# Patient Record
Sex: MALE | Race: WHITE | Employment: FULL TIME | URBAN - METROPOLITAN AREA
[De-identification: names, ages, dates, MRNs, and addresses within clinical notes are randomized per-mention and may not be internally consistent; named-entity substitution may affect disease eponyms.]

---

## 2017-02-15 ENCOUNTER — OFFICE VISIT (OUTPATIENT)
Dept: FAMILY MEDICINE | Facility: CLINIC | Age: 32
End: 2017-02-15
Payer: COMMERCIAL

## 2017-02-15 DIAGNOSIS — L21.9 SEBORRHEIC DERMATITIS: Primary | ICD-10-CM

## 2017-02-15 DIAGNOSIS — L30.0 NUMMULAR ECZEMA: ICD-10-CM

## 2017-02-15 DIAGNOSIS — L70.0 ACNE VULGARIS: ICD-10-CM

## 2017-02-15 PROCEDURE — 99214 OFFICE O/P EST MOD 30 MIN: CPT | Performed by: FAMILY MEDICINE

## 2017-02-15 RX ORDER — PIMECROLIMUS 10 MG/G
CREAM TOPICAL 2 TIMES DAILY
Qty: 60 G | Refills: 0 | Status: CANCELLED | OUTPATIENT
Start: 2017-02-15

## 2017-02-15 RX ORDER — CICLOPIROX OLAMINE 7.7 MG/G
CREAM TOPICAL 2 TIMES DAILY
Status: CANCELLED | OUTPATIENT
Start: 2017-02-15

## 2017-02-15 RX ORDER — FLUOCINONIDE TOPICAL SOLUTION USP, 0.05% 0.5 MG/ML
SOLUTION TOPICAL DAILY
Qty: 60 ML | Refills: 0 | Status: SHIPPED | OUTPATIENT
Start: 2017-02-15 | End: 2019-10-14

## 2017-02-15 RX ORDER — TACROLIMUS 0.3 MG/G
OINTMENT TOPICAL
Qty: 30 G | Refills: 0 | Status: SHIPPED | OUTPATIENT
Start: 2017-02-15 | End: 2020-06-25

## 2017-02-15 NOTE — PATIENT INSTRUCTIONS
"FUTURE APPOINTMENTS  Follow up as needed.    SHAMPOO INSTRUCTIONS  Alternating use of shampoos with different active ingredients every 4 week(s).   Coal Tar shampoos : Neutrogena T/Gel, Denorex, DHS Tar, Ionil-T, Tegrin, X-Seb T, Zetar  Zinc Pyrithione shampoos : Head & Shoulders, Denorex Advance Formula, DHS Zinc, Zincon  Salicylic Acid shampoos : Neutrogena T/Sal, DHS Sal, Ionil, P&S, Sebulex, X-Seb  Selenium Sulfide shampoos : Selsun Blue shampoo  Sulfur shampoos : Sebulex    TOPICAL STEROID INSTRUCTIONS - for use on scalp  Fluocinonide 0.05% solution.    Apply a few drops and rub into the affected areas on the scalp, at bedtime for 7-10 days.    Not to be used on face or groin.      After initial treatment, topical steroid may be used as needed for flare-ups. However, do not use for more than 10 consecutive days.     Topical steroid use is for short-term treatment only. If after initial treatment, you are using this for prolonged periods of time, return to clinic for re-evaluation of treatment.    TOPICAL MEDICATION INSTRUCTIONS - for use on face  Tacrolimus (Protopic) 0.1% ointment.    Apply a thin layer to the affected area(s) two times per day. Discontinue when symptoms have resolved.    This is not a steroid, and it can be used on the face.    Keep in mind to also regularly use moisturizer, as this preventative measure can help maintain your skin's natural moisture barrier.    Apply moisturizer after application of medication.    Your health insurance may need a prior authorization for coverage of this medication.    Although there is a black box warning for cancer risk from this medication, the study involved oral consumption by monkeys of this medication at 30 times the maximum recommended dosage.  Consider comparing prices on [employer] or [employer]ire     ACNE TREATMENT PLAN  Morning or Evening (whenever you shower) :    Cetaphil facial cleanser.    Do a \"20/10\" wash (20 seconds of skin contact " "with the cleanser followed by a 10 second rinse)    Apply to face.    After cleansing, medication : take by mouth 1 tablet of oral Minocycline 50 mg until supply has finished.    Evening or Morning (other time of day) :    Cetaphil facial cleanser - Do a \"20 / 10 wash\" again.    Recommendations if skin becomes too dry in response to medication:    Use Cetaphil facial moisturizer.    Call back directly if there are further questions, or if these symptoms fail to improve as anticipated or worsen.   "

## 2017-02-15 NOTE — MR AVS SNAPSHOT
After Visit Summary   2/15/2017    Jet Yañez    MRN: [id number]           Patient Information     Date Of Birth          1985        Visit Information        Provider Department      2/15/2017 2:20 PM Lisa Lao MD [facility] - Primary Care Skin        Today's Diagnoses     Seborrheic dermatitis    -  1    Nummular eczema          Care Instructions    FUTURE APPOINTMENTS  Follow up as needed.    SHAMPOO INSTRUCTIONS  Alternating use of shampoos with different active ingredients every 4 week(s).   Coal Tar shampoos : Neutrogena T/Gel, Denorex, DHS Tar, Ionil-T, Tegrin, X-Seb T, Zetar  Zinc Pyrithione shampoos : Head & Shoulders, Denorex Advance Formula, DHS Zinc, Zincon  Salicylic Acid shampoos : Neutrogena T/Sal, DHS Sal, Ionil, P&S, Sebulex, X-Seb  Selenium Sulfide shampoos : Selsun Blue shampoo  Sulfur shampoos : Sebulex    TOPICAL STEROID INSTRUCTIONS - for use on scalp  Fluocinonide 0.05% solution.    Apply a few drops and rub into the affected areas on the scalp, at bedtime for 7-10 days.    Not to be used on face or groin.      After initial treatment, topical steroid may be used as needed for flare-ups. However, do not use for more than 10 consecutive days.     Topical steroid use is for short-term treatment only. If after initial treatment, you are using this for prolonged periods of time, return to clinic for re-evaluation of treatment.    TOPICAL MEDICATION INSTRUCTIONS - for use on face  Tacrolimus (Protopic) 0.1% ointment.    Apply a thin layer to the affected area(s) two times per day. Discontinue when symptoms have resolved.    This is not a steroid, and it can be used on the face.    Keep in mind to also regularly use moisturizer, as this preventative measure can help maintain your skin's natural moisture barrier.    Apply moisturizer after application of medication.    Your health insurance may need a prior authorization for coverage of this  "medication.    Although there is a black box warning for cancer risk from this medication, the study involved oral consumption by monkeys of this medication at 30 times the maximum recommended dosage.  Consider comparing prices on [employer] or [employer]     ACNE TREATMENT PLAN  Morning or Evening (whenever you shower) :    Cetaphil facial cleanser.    Do a \"20/10\" wash (20 seconds of skin contact with the cleanser followed by a 10 second rinse)    Apply to face.    After cleansing, medication : take by mouth 1 tablet of oral Minocycline 50 mg until supply has finished.    Evening or Morning (other time of day) :    Cetaphil facial cleanser - Do a \"20 / 10 wash\" again.    Recommendations if skin becomes too dry in response to medication:    Use Cetaphil facial moisturizer.    Call back directly if there are further questions, or if these symptoms fail to improve as anticipated or worsen.         Follow-ups after your visit        Follow-up notes from your care team     Return if symptoms worsen or fail to improve.      Who to contact     If you have questions or need follow up information about today's clinic visit or your schedule please contact [facility] - PRIMARY C[facility] SK[facility] directly at [phone].  Normal or non-critical lab and imaging results will be communicated to you by GotGamehart, letter or phone within 4 business days after the clinic has received the results. If you do not hear from us within 7 days, please contact the clinic through GotGamehart or phone. If you have a critical or abnormal lab result, we will notify you by phone as soon as possible.  Submit refill requests through [employer]ius or call your pharmacy and they will forward the refill request to us. Please allow 3 business days for your refill to be completed.          Additional Information About Your Visit        GotGamehart Information     [employer]ius lets you send messages to your doctor, view your test results, renew your prescriptions, " "schedule appointments and more. To sign up, go to [email or web address] . Click on \"Log in\" on the left side of the screen, which will take you to the Welcome page. Then click on \"Sign up Now\" on the right side of the page.     You will be asked to enter the access code listed below, as well as some personal information. Please follow the directions to create your username and password.     Your access code is: [email or web address]  Expires: 2017  2:40 PM     Your access code will  in 90 days. If you need help or a new code, please call your [facility] clinic or [phone].        Care EveryWhere ID     This is your Care EveryWhere ID. This could be used by other organizations to access your [facility] medical records  [id number]         Blood Pressure from Last 3 Encounters:   07 136/68    Weight from Last 3 Encounters:   07 207 lb (93.9 kg)              Today, you had the following     No orders found for display         Today's Medication Changes          These changes are accurate as of: 2/15/17  2:44 PM.  If you have any questions, ask your nurse or doctor.               Start taking these medicines.        Dose/Directions    fluocinonide 0.05 % solution   Commonly known as:  LIDEX   Used for:  Seborrheic dermatitis   Started by:  Lisa Lao MD        Apply topically daily   Quantity:  60 mL   Refills:  0       tacrolimus 0.03 % ointment   Commonly known as:  PROTOPIC   Used for:  Seborrheic dermatitis   Started by:  Lisa Lao MD        Apply to affected areas on face bid when needed.   Quantity:  30 g   Refills:  0            Where to get your medicines      These medications were sent to [facility] - [place], [place] - [address]  [address], [place] [address]     Phone:  [phone]     fluocinonide 0.05 % solution    tacrolimus 0.03 % ointment                Primary Care Provider Office Phone # Fax #    Arnulfo Mendoza [phone] " [phone]       [facility] [address] [facility] [address]        Thank you!     Thank you for choosing [facility] - PRIMARY CARE SKIN  for your care. Our goal is always to provide you with excellent care. Hearing back from our patients is one way we can continue to improve our services. Please take a few minutes to complete the written survey that you may receive in the mail after your visit with us. Thank you!             Your Updated Medication List - Protect others around you: Learn how to safely use, store and throw away your medicines at www.disposemymeds.org.          This list is accurate as of: 2/15/17  2:44 PM.  Always use your most recent med list.                   Brand Name Dispense Instructions for use    * CICLOPIROX & LACQUER REMOVAL EX          * ciclopirox 8 % Soln     6.6 mL    Apply to affected nails q hs       desonide 0.05 % cream    DESOWEN    15 g    Apply sparingly to affected area on side of the nose  q day for 7 days and then when needed. Not to be used on a daily basis.       fluocinonide 0.05 % solution    LIDEX    60 mL    Apply topically daily       ketoconazole 2 % cream    NIZORAL    30 g    Apply topically 2 times daily       metroNIDAZOLE 0.75 % cream    METROCREAM     Apply topically 2 times daily       * minocycline 50 MG capsule    MINOCIN/DYNACIN         * minocycline 50 MG capsule    MINOCIN/DYNACIN    60 capsule    2 caps po q day       tacrolimus 0.03 % ointment    PROTOPIC    30 g    Apply to affected areas on face bid when needed.       * Notice:  This list has 4 medication(s) that are the same as other medications prescribed for you. Read the directions carefully, and ask your doctor or other care provider to review them with you.

## 2017-02-15 NOTE — PROGRESS NOTES
[facility] - PRIMARY CARE SKIN    CC : seborrheic dermatitis, acne  SUBJECTIVE:                                                    Jet Yañez is a 31 year old male who presents to clinic today for follow-up of seborrheic dermatitis on the sides of the nose and on the scalp. Dandruff has not been as much of an issue so he stopped using the anti dandruff shampoos but he has noticed some red scaly patches on the side of the scalp.  Scalp is most itchy after warm showers in the morning.    Current treatment :   Ketoconazole 2% cream has been ineffective with 2 weeks' usage.  Metronidazole 0.75% gel irritated symptoms, making symptoms redder.  Desonide 0.05% cream controls symptoms, but he has had to use it more frequently.    Side effects noted : none    Acne  Acne control is satisfactory.    Current treatment : minocycline 100 mg qd.  Response to treatment : Minocycline has given good control of pimples on the face.  Side effects noted : none    Rash  A red, circular rash has been on the groin which was mildly itchy at onset. It has turned dark brown after beginning to resolve. He denies use of new products or clothing.        Uses sunscreen : YES.  Previous history of significant sun exposure : YES.    Personal Medical History  Skin Cancer : NO  Eczema Psoriasis Rosacea Sensitive Skin Autoimmune   ? YES - since childhood NO NO ? YES NO     Family Medical History  Skin Cancer : NO  Eczema Psoriasis Rosacea Sensitive Skin Autoimmune   YES NO NO YES YES - Graves' disease     Occupation : engineering (indoor).    There is no problem list on file for this patient.      History reviewed. No pertinent past medical history. Past Surgical History   Procedure Laterality Date     No history of surgery        Social History   Substance Use Topics     Smoking status: Never Smoker     Smokeless tobacco: Not on file     Alcohol use Yes      Comment: 3 to 5 drinkd weekly         Prescription Medications as of 2/15/2017              fluocinonide (LIDEX) 0.05 % solution Apply topically daily    tacrolimus (PROTOPIC) 0.03 % ointment Apply to affected areas on face bid when needed.    desonide (DESOWEN) 0.05 % cream Apply sparingly to affected area on side of the nose  q day for 7 days and then when needed. Not to be used on a daily basis.    minocycline (MINOCIN,DYNACIN) 50 MG capsule     CICLOPIROX & LACQUER REMOVAL EX     metroNIDAZOLE (METROCREAM) 0.75 % cream Apply topically 2 times daily    ketoconazole (NIZORAL) 2 % cream Apply topically 2 times daily    minocycline (MINOCIN,DYNACIN) 50 MG capsule 2 caps po q day    ciclopirox 8 % SOLN Apply to affected nails q hs          No Known Allergies     INTEGUMENTARY/SKIN: NEGATIVE for acne. POSITIVE for changing lesion, nail changes, pigmentation, pruritis, rash and scaling  ROS : 14 point review of systems was negative except the symptoms listed above in the HPI.    This document serves as a record of the services and decisions personally performed and made by Shanae Lao MD. It was created on her behalf by Alen Collado, a trained medical scribe.  The creation of this document is based on the scribe's personal observations and the provider's statements to the medical scribe.  Alen Collado, February 15, 2017 2:28 PM      OBJECTIVE:                                                    GENERAL: healthy, alert and no distress  SKIN: [place] Skin Type - II.  Face were examined. The dermatoscope was used to help evaluate pigmented lesions.  Skin Pertinent Findings:  Face, along nasolabial grooves : Residual erythema. No acneiform lesions.    Scalp : Scattered erythematous patches with greasy scales on left and right parietal scalps, slight extension of occipital scalp, sparing crown of scalp.    Fingernails : No pitting.    Left proximal medial thigh : Patch of residual post-inflammatory hyperpigmentation.    Diagnostic Test Results:  none       ASSESSMENT:                                                       Encounter Diagnoses   Name Primary?     Seborrheic dermatitis Yes     Nummular eczema      Acne vulgaris          PLAN:                                                    Patient Instructions   FUTURE APPOINTMENTS  Follow up as needed.    SHAMPOO INSTRUCTIONS  Alternating use of shampoos with different active ingredients every 4 week(s).   Coal Tar shampoos : Neutrogena T/Gel, Denorex, DHS Tar, Ionil-T, Tegrin, X-Seb T, Zetar  Zinc Pyrithione shampoos : Head & Shoulders, Denorex Advance Formula, DHS Zinc, Zincon  Salicylic Acid shampoos : Neutrogena T/Sal, DHS Sal, Ionil, P&S, Sebulex, X-Seb  Selenium Sulfide shampoos : Selsun Blue shampoo  Sulfur shampoos : Sebulex    TOPICAL STEROID INSTRUCTIONS - for use on scalp  Fluocinonide 0.05% solution.    Apply a few drops and rub into the affected areas on the scalp, at bedtime for 7-10 days.    Not to be used on face or groin.      After initial treatment, topical steroid may be used as needed for flare-ups. However, do not use for more than 10 consecutive days.     Topical steroid use is for short-term treatment only. If after initial treatment, you are using this for prolonged periods of time, return to clinic for re-evaluation of treatment.    TOPICAL MEDICATION INSTRUCTIONS - for use on face  Tacrolimus (Protopic) 0.1% ointment.    Apply a thin layer to the affected area(s) two times per day. Discontinue when symptoms have resolved.    This is not a steroid, and it can be used on the face.    Keep in mind to also regularly use moisturizer, as this preventative measure can help maintain your skin's natural moisture barrier.    Apply moisturizer after application of medication.    Your health insurance may need a prior authorization for coverage of this medication.    Although there is a black box warning for cancer risk from this medication, the study involved oral consumption by monkeys of this medication at 30 times the maximum recommended  "dosage.  Consider comparing prices on Hyperpia, Topix or Turbine     ACNE TREATMENT PLAN  Morning or Evening (whenever you shower) :    Cetaphil facial cleanser.    Do a \"20/10\" wash (20 seconds of skin contact with the cleanser followed by a 10 second rinse)    Apply to face.    After cleansing, medication : take by mouth 1 tablet of oral Minocycline 50 mg until supply has finished.    Evening or Morning (other time of day) :    Cetaphil facial cleanser - Do a \"20 / 10 wash\" again.    Recommendations if skin becomes too dry in response to medication:    Use Cetaphil facial moisturizer.    Call back directly if there are further questions, or if these symptoms fail to improve as anticipated or worsen.        The patient was counseled to use products free of fragrance, dyes, and plants. The importance of using bland cleansers and the regular use of heavy bland emollient creams was impressed upon the patient.      PROCEDURES:                                                    None.    TT  20 minutes.  CT : 15 minutes.      The information in this document, created by the medical scribe for me, accurately reflects the services I personally performed and the decisions made by me. I have reviewed and approved this document for accuracy prior to leaving the patient care area.  Shanae Lao MD February 15, 2017 2:19 PM  [facility] - PRIMARY CARE SKIN  "

## 2017-10-17 DIAGNOSIS — L21.9 SEBORRHEIC DERMATITIS: ICD-10-CM

## 2017-10-17 NOTE — TELEPHONE ENCOUNTER
ketoconazole (NIZORAL) 2 % cream      Last Written Prescription Date: 7/15/2016  Last Fill Quantity: 30,  # refills: 1   Last Office Visit with [facility], [facility] or [facility] prescribing provider: 2/15/2017

## 2017-10-18 RX ORDER — KETOCONAZOLE 20 MG/G
CREAM TOPICAL
Qty: 30 G | Refills: 0 | Status: SHIPPED | OUTPATIENT
Start: 2017-10-18 | End: 2018-01-08

## 2017-10-18 NOTE — TELEPHONE ENCOUNTER
Routing refill request to provider for review/approval because:  Drug interaction warning    Shaylee KISERRN  [facility]  [phone]

## 2018-01-08 ENCOUNTER — MYC REFILL (OUTPATIENT)
Dept: FAMILY MEDICINE | Facility: CLINIC | Age: 33
End: 2018-01-08

## 2018-01-08 DIAGNOSIS — L21.9 SEBORRHEIC DERMATITIS: ICD-10-CM

## 2018-01-08 RX ORDER — KETOCONAZOLE 20 MG/G
CREAM TOPICAL
Qty: 30 G | Refills: 3 | Status: SHIPPED | OUTPATIENT
Start: 2018-01-08 | End: 2018-12-27

## 2018-01-08 NOTE — TELEPHONE ENCOUNTER
Requested Prescriptions   Pending Prescriptions Disp Refills     ketoconazole (NIZORAL) 2 % cream 30 g 0    There is no refill protocol information for this order        Shaylee Garcia RN  [facility]  [phone]

## 2018-01-08 NOTE — TELEPHONE ENCOUNTER
Message from [employer]:  Original authorizing provider: Lisa Lao MD    Jet Yañez would like a refill of the following medications:  ketoconazole (NIZORAL) 2 % cream [Lsia Lao MD]    Preferred pharmacy: [facility] [id number] [facility] [address]    Comment:  Please renew my prescription for ketoconazole shampoo. Thank you.

## 2018-12-27 ENCOUNTER — OFFICE VISIT (OUTPATIENT)
Dept: FAMILY MEDICINE | Facility: CLINIC | Age: 33
End: 2018-12-27
Payer: COMMERCIAL

## 2018-12-27 VITALS — HEART RATE: 67 BPM | SYSTOLIC BLOOD PRESSURE: 130 MMHG | DIASTOLIC BLOOD PRESSURE: 78 MMHG | RESPIRATION RATE: 18 BRPM

## 2018-12-27 DIAGNOSIS — L70.0 ACNE VULGARIS: ICD-10-CM

## 2018-12-27 DIAGNOSIS — L21.9 SEBORRHEIC DERMATITIS: Primary | ICD-10-CM

## 2018-12-27 PROCEDURE — 99213 OFFICE O/P EST LOW 20 MIN: CPT | Performed by: FAMILY MEDICINE

## 2018-12-27 RX ORDER — MINOCYCLINE HYDROCHLORIDE 50 MG/1
CAPSULE ORAL
Qty: 60 CAPSULE | Refills: 6 | Status: SHIPPED | OUTPATIENT
Start: 2018-12-27 | End: 2020-01-21

## 2018-12-27 RX ORDER — TACROLIMUS 1 MG/G
OINTMENT TOPICAL
Qty: 30 G | Refills: 3 | Status: SHIPPED | OUTPATIENT
Start: 2018-12-27 | End: 2019-10-14

## 2018-12-27 NOTE — LETTER
12/27/2018         RE: Jet Yañez  [address]  [place] [address]        Dear Colleague,    Thank you for referring your patient, Jet Yañez, to the [facility]. Please see a copy of my visit note below.    [facility], [place] - PRIMARY CARE SKIN    CC : seborrheic dermatitis, acne  SUBJECTIVE:                                                    Jet Yañez is a 33 year old male who presents to clinic today for follow-up of seborrheic dermatitis on the sides of the nose and on the scalp.     Current treatment :   Anti-dandruff shampoo has been used to control dandruff  Tacrolimus 0.03% has been helping, but at times it will not completely clear the rash and it will recur  Ketoconazole 2% cream, metronidazole gel not effective    Side effects noted : none    Issue Two: Acne control has been fairly satisfactory. Jet continues minocycline 50 mg BID. He has been developing inflammatory nodules on the face.     Uses sunscreen : YES.  Previous history of significant sun exposure : YES.    Personal Medical History  Skin Cancer : NO  Eczema Psoriasis Rosacea Sensitive Skin Autoimmune   ? YES - since childhood NO NO ? YES NO     Family Medical History  Skin Cancer : NO  Eczema Psoriasis Rosacea Sensitive Skin Autoimmune   YES NO NO YES YES - Graves' disease     Occupation : engineering (indoor).    There is no problem list on file for this patient.      History reviewed. No pertinent past medical history. Past Surgical History:   Procedure Laterality Date     NO HISTORY OF SURGERY        Social History     Tobacco Use     Smoking status: Never Smoker     Smokeless tobacco: Never Used   Substance Use Topics     Alcohol use: Yes     Comment: 3 to 5 drinkd weekly     Drug use: No         Prescription Medications as of 12/27/2018       Rx Number Disp Refills Start End Last Dispensed Date Next Fill Date Owning Pharmacy    CICLOPIROX & LACQUER REMOVAL EX            Class: Historical    Route:  Apply externally    ciclopirox 8 % SOLN  6.6 mL 3 7/15/2016    [id number] [place] [address] [facility]    Sig: Apply to affected nails q hs    Class: E-Prescribe    desonide (DESOWEN) 0.05 % cream  15 g 0 8/3/2016    [id number] [place] [address] [facility]    Sig: Apply sparingly to affected area on side of the nose  q day for 7 days and then when needed. Not to be used on a daily basis.    Class: E-Prescribe    fluocinonide (LIDEX) 0.05 % solution  60 mL 0 2/15/2017    [facility] [place] [address] [facility]    Sig: Apply topically daily    Class: E-Prescribe    Route: Topical    minocycline (MINOCIN,DYNACIN) 50 MG capsule    2016        Class: Historical    minocycline (MINOCIN,DYNACIN) 50 MG capsule  60 capsule 6 7/15/2016    [facility] [place] [address]    Si caps po q day    Class: E-Prescribe    tacrolimus (PROTOPIC) 0.03 % ointment  30 g 0 2/15/2017    [id number] [place] [address] [facility]    Sig: Apply to affected areas on face bid when needed.    Class: E-Prescribe    Notes to Pharmacy: Failed treatment with ketoconazole cream, desonide          No Known Allergies     INTEGUMENTARY/SKIN: NEGATIVE for acne. POSITIVE for changing lesion, nail changes, pigmentation, pruritis, rash and scaling  ROS : 14 point review of systems was negative except the symptoms listed above in the HPI.    This document serves as a record of the services and decisions personally performed and made by Shanae Lao MD. It was created on her behalf by Malaika Robles, a trained medical scribe.  The creation of this document is based on the scribe's personal observations and the provider's statements to the medical scribe.  Malaika Robles, 2018 2:56 PM    OBJECTIVE:                                                    GENERAL: healthy, alert and no distress  SKIN: Nelson Skin Type - II.  Face were examined. The dermatoscope was  used to help evaluate pigmented lesions.  Skin Pertinent Findings:  Face : scattered papules and erythema along the ala nasi    Scalp : clear    Diagnostic Test Results:  none     ASSESSMENT:                                                      Encounter Diagnoses   Name Primary?     Acne vulgaris      Seborrheic dermatitis Yes       MDM:  Discussed other acne control options including oral isotretinoin.     PLAN:                                                    Patient Instructions   FUTURE APPOINTMENTS  Follow up in 1 year    Continue with tacrolimus 0.1 % two times per day when needed  Minocycline 50 mg two times per day for acne flares  Stop the ketoconazole cream   Recheck in one year, sooner if not controlled     The patient was counseled to use products free of fragrance, dyes, and plants. The importance of using bland cleansers and the regular use of heavy bland emollient creams was impressed upon the patient.    PROCEDURES:                                                    None.    TT  20 minutes.  CT : 15 minutes.    The information in this document, created by the medical scribe for me, accurately reflects the services I personally performed and the decisions made by me. I have reviewed and approved this document for accuracy prior to leaving the patient care area.  December 27, 2018 2:56 PM  Lisa Lao MD  [facility] [place]    Again, thank you for allowing me to participate in the care of your patient.        Sincerely,        Lisa Lao MD

## 2018-12-27 NOTE — PROGRESS NOTES
[facility] - PRIMARY CARE SKIN    CC : seborrheic dermatitis, acne  SUBJECTIVE:                                                    Jet Yañez is a 33 year old male who presents to clinic today for follow-up of seborrheic dermatitis on the sides of the nose and on the scalp.     Current treatment :   Anti-dandruff shampoo has been used to control dandruff  Tacrolimus 0.03% has been helping, but at times it will not completely clear the rash and it will recur  Ketoconazole 2% cream, metronidazole gel not effective    Side effects noted : none    Issue Two: Acne control has been fairly satisfactory. Jet continues minocycline 50 mg BID. He has been developing inflammatory nodules on the face.     Uses sunscreen : YES.  Previous history of significant sun exposure : YES.    Personal Medical History  Skin Cancer : NO  Eczema Psoriasis Rosacea Sensitive Skin Autoimmune   ? YES - since childhood NO NO ? YES NO     Family Medical History  Skin Cancer : NO  Eczema Psoriasis Rosacea Sensitive Skin Autoimmune   YES NO NO YES YES - Graves' disease     Occupation : engineering (indoor).    There is no problem list on file for this patient.      History reviewed. No pertinent past medical history. Past Surgical History:   Procedure Laterality Date     NO HISTORY OF SURGERY        Social History     Tobacco Use     Smoking status: Never Smoker     Smokeless tobacco: Never Used   Substance Use Topics     Alcohol use: Yes     Comment: 3 to 5 drinkd weekly     Drug use: No         Prescription Medications as of 12/27/2018       Rx Number Disp Refills Start End Last Dispensed Date Next Fill Date Owning Pharmacy    CICLOPIROX & LACQUER REMOVAL EX            Class: Historical    Route: Apply externally    ciclopirox 8 % SOLN  6.6 mL 3 7/15/2016    [id number] [place] [facility] [place] [address] [facility]    Sig: Apply to affected nails q hs    Class: E-Prescribe    desonide (DESOWEN) 0.05 % cream  15 g 0 8/3/2016    [facility] [place]  [place] [address] [facility]    Sig: Apply sparingly to affected area on side of the nose  q day for 7 days and then when needed. Not to be used on a daily basis.    Class: E-Prescribe    fluocinonide (LIDEX) 0.05 % solution  60 mL 0 2/15/2017    [facility] [place] [address] [facility]    Sig: Apply topically daily    Class: E-Prescribe    Route: Topical    minocycline (MINOCIN,DYNACIN) 50 MG capsule    2016        Class: Historical    minocycline (MINOCIN,DYNACIN) 50 MG capsule  60 capsule 6 7/15/2016    [id number] [place] [address]    Si caps po q day    Class: E-Prescribe    tacrolimus (PROTOPIC) 0.03 % ointment  30 g 0 2/15/2017    [facility] [place] [address]    Sig: Apply to affected areas on face bid when needed.    Class: E-Prescribe    Notes to Pharmacy: Failed treatment with ketoconazole cream, desonide          No Known Allergies     INTEGUMENTARY/SKIN: NEGATIVE for acne. POSITIVE for changing lesion, nail changes, pigmentation, pruritis, rash and scaling  ROS : 14 point review of systems was negative except the symptoms listed above in the HPI.    This document serves as a record of the services and decisions personally performed and made by Shanae Lao MD. It was created on her behalf by Malaika Robles, a trained medical scribe.  The creation of this document is based on the scribe's personal observations and the provider's statements to the medical scribe.  Malaika Robles, 2018 2:56 PM    OBJECTIVE:                                                    GENERAL: healthy, alert and no distress  SKIN: Nelson Skin Type - II.  Face were examined. The dermatoscope was used to help evaluate pigmented lesions.  Skin Pertinent Findings:  Face : scattered papules and erythema along the ala nasi    Scalp : clear    Diagnostic Test Results:  none     ASSESSMENT:                                                       Encounter Diagnoses   Name Primary?     Acne vulgaris      Seborrheic dermatitis Yes       MDM:  Discussed other acne control options including oral isotretinoin.     PLAN:                                                    Patient Instructions   FUTURE APPOINTMENTS  Follow up in 1 year    Continue with tacrolimus 0.1 % two times per day when needed  Minocycline 50 mg two times per day for acne flares  Stop the ketoconazole cream   Recheck in one year, sooner if not controlled     The patient was counseled to use products free of fragrance, dyes, and plants. The importance of using bland cleansers and the regular use of heavy bland emollient creams was impressed upon the patient.    PROCEDURES:                                                    None.    TT  20 minutes.  CT : 15 minutes.    The information in this document, created by the medical scribe for me, accurately reflects the services I personally performed and the decisions made by me. I have reviewed and approved this document for accuracy prior to leaving the patient care area.  December 27, 2018 2:56 PM  Lisa Lao MD  [facility] [place]

## 2018-12-27 NOTE — PATIENT INSTRUCTIONS
FUTURE APPOINTMENTS  Follow up in 1 year    Continue with tacrolimus 0.1 % two times per day when needed  Minocycline 50 mg two times per day for acne flares  Stop the ketoconazole cream   Recheck in one year, sooner if not controlled

## 2019-01-08 ENCOUNTER — TELEPHONE (OUTPATIENT)
Dept: FAMILY MEDICINE | Facility: CLINIC | Age: 34
End: 2019-01-08

## 2019-01-08 NOTE — TELEPHONE ENCOUNTER
Prior Authorization Retail Medication Request    Medication/Dose: Tacrolimus 0.1 % ointment  ICD code (if different than what is on RX):    Previously Tried and Failed: Anti-dandruff shampoo has been used to control dandruff  Tacrolimus 0.03% has been helping, but at times it will not completely clear the rash and it will recur  Ketoconazole 2% cream, metronidazole gel not effective  Rationale:      Insurance Name:    Coverage information:     Subscriber: [id number] LEONARD SPARKS     Rel to sub: 01 - Self     Member ID: [id number]     Payor: [facility] Ph: [phone]     Benefit plan: [id number] OUT OF STATE Ph: [phone]     Group number: [id number]     Member effective dates: from 01/01/13        Pharmacy Information (if different than what is on RX)  Name:    Phone:

## 2019-01-08 NOTE — TELEPHONE ENCOUNTER
insurance requires a prior auth for Tacrolimus- do you want to start a prior auth?    Per last OV 12/27/18:    Current treatment :   Anti-dandruff shampoo has been used to control dandruff  Tacrolimus 0.03% has been helping, but at times it will not completely clear the rash and it will recur  Ketoconazole 2% cream, metronidazole gel not effective

## 2019-01-08 NOTE — TELEPHONE ENCOUNTER
Shilo, Lisa Rojo MD   You 16 minutes ago (3:56 PM)     Yes,     Thank you,    Lisa Lao M.D.  (Routing comment)

## 2019-01-15 NOTE — TELEPHONE ENCOUNTER
Central Prior Authorization Team   Phone: [phone]      PA Initiation    Medication: Tacrolimus 0.1 % ointment  Insurance Company: Comment:  [email or web address] - website  Pharm[facility]illing the [id number] [place] [facility] [place] [facility] [address] [address]  Filling Pharmacy Phone: [phone]  Filling Pharmacy Fax: [phone]  Start Date: 1/12/2019    INITIATED VIA [employer] WEBSITE.

## 2019-01-16 NOTE — TELEPHONE ENCOUNTER
Prior Authorization Approval    Authorization Effective Date: 1/16/2019  Authorization Expiration Date: 1/16/2020  Medication: Tacrolimus 0.1 % ointment-APPROVED  Approved Dose/Quantity:    Reference #:     Insurance Company: Comment:  [email or web address] - [employer]  Expected CoPay:       CoPay Card Available:      Foundation Assistance Needed:    Which Pharmacy is filling the prescription (Not needed for infusion/clinic administered): [id number] [place] [facility] [address] [facility]  Pharmacy Notified: Yes  Patient Notified: Yes

## 2019-10-14 ENCOUNTER — MYC REFILL (OUTPATIENT)
Dept: FAMILY MEDICINE | Facility: CLINIC | Age: 34
End: 2019-10-14

## 2019-10-14 DIAGNOSIS — L21.9 SEBORRHEIC DERMATITIS: ICD-10-CM

## 2019-10-14 RX ORDER — TACROLIMUS 1 MG/G
OINTMENT TOPICAL
Qty: 30 G | Refills: 3 | Status: SHIPPED | OUTPATIENT
Start: 2019-10-14 | End: 2020-06-25

## 2019-10-14 RX ORDER — FLUOCINONIDE TOPICAL SOLUTION USP, 0.05% 0.5 MG/ML
SOLUTION TOPICAL DAILY
Qty: 60 ML | Refills: 0 | Status: SHIPPED | OUTPATIENT
Start: 2019-10-14

## 2019-10-14 NOTE — TELEPHONE ENCOUNTER
"    Requested Prescriptions   Pending Prescriptions Disp Refills     fluocinonide (LIDEX) 0.05 % external solution 60 mL 0     Sig: Apply topically daily  Last Written Prescription Date:  02/15/17  Last Fill Quantity: 60,  # refills: 0   Last office visit: 12/27/2018 with prescribing provider:     Future Office Visit:           Topical Steroids and Nonsteroidals Protocol Failed - 10/14/2019  9:23 AM        Failed - High potency steroid not ordered        Passed - Patient is age 6 or older        Passed - Authorizing prescriber's most recent note related to this medication read.     If refill request is for ophthalmic use, please forward request to provider for approval.          Passed - Recent (12 mo) or future (30 days) visit within the authorizing provider's specialty     Patient has had an office visit with the authorizing provider or a provider within the authorizing providers department within the previous 12 mos or has a future within next 30 days. See \"Patient Info\" tab in inbasket, or \"Choose Columns\" in Meds & Orders section of the refill encounter.              Passed - Medication is active on med list        tacrolimus (PROTOPIC) 0.1 % external ointment  Last Written Prescription Date:  12/27/18  Last Fill Quantity: 30g,  # refills: 3   Last office visit: 12/27/2018 with prescribing provider:     Future Office Visit:     30 g 3     Sig: Apply to AA on face bid when needed       Topical Steroids and Nonsteroidals Protocol Failed - 10/14/2019  9:23 AM        Failed - High potency steroid not ordered        Passed - Patient is age 6 or older        Passed - Authorizing prescriber's most recent note related to this medication read.     If refill request is for ophthalmic use, please forward request to provider for approval.          Passed - Recent (12 mo) or future (30 days) visit within the authorizing provider's specialty     Patient has had an office visit with the authorizing provider or a provider " "within the authorizing providers department within the previous 12 mos or has a future within next 30 days. See \"Patient Info\" tab in inbasket, or \"Choose Columns\" in Meds & Orders section of the refill encounter.              Passed - Medication is active on med list          "

## 2019-11-05 ENCOUNTER — HEALTH MAINTENANCE LETTER (OUTPATIENT)
Age: 34
End: 2019-11-05

## 2020-06-22 ENCOUNTER — E-VISIT (OUTPATIENT)
Dept: FAMILY MEDICINE | Facility: CLINIC | Age: 35
End: 2020-06-22
Payer: COMMERCIAL

## 2020-06-22 ENCOUNTER — MYC REFILL (OUTPATIENT)
Dept: FAMILY MEDICINE | Facility: CLINIC | Age: 35
End: 2020-06-22

## 2020-06-22 DIAGNOSIS — Z53.9 ERRONEOUS ENCOUNTER--DISREGARD: Primary | ICD-10-CM

## 2020-06-22 DIAGNOSIS — L70.0 ACNE VULGARIS: ICD-10-CM

## 2020-06-22 RX ORDER — MINOCYCLINE HYDROCHLORIDE 50 MG/1
CAPSULE ORAL
Qty: 60 CAPSULE | Refills: 0 | Status: CANCELLED | OUTPATIENT
Start: 2020-06-22

## 2020-06-23 NOTE — TELEPHONE ENCOUNTER
"Sent mychart advising appointment - no refills  Due to date of last office appointment   Last office visit: 12/27/2018 with prescribing provider:     Future Office Visit:          Requested Prescriptions   Pending Prescriptions Disp Refills     minocycline (MINOCIN) 50 MG capsule 60 capsule 0     Sig: TAKE 2 CAPS BY MOUTH DAILY       Oral Acne/Rosacea Medications Protocol Failed - 6/22/2020  8:05 AM        Failed - Recent (12 mo) or future (30 days) visit within the authorizing provider's specialty     Patient has had an office visit with the authorizing provider or a provider within the authorizing providers department within the previous 12 mos or has a future within next 30 days. See \"Patient Info\" tab in inbasket, or \"Choose Columns\" in Meds & Orders section of the refill encounter.              Failed - Confirmation of diagnosis is required     Please confirm diagnosis is acne or rosacea.     If NOT acne or rosacea; refer request to provider for further evaluation.    If diagnosis IS acne or rosacea, OK to refill BASED ON PREVIOUS REFILL CLINICAL NOTE RECOMMENDATION.          Passed - Patient is 12 years of age or older        Passed - Medication is active on med list             "

## 2020-06-24 NOTE — PROGRESS NOTES
"Jet Yañez is a 35 year old male  who is being evaluated via a billable telephone visit.    The patient has been notified of following:   \"This telephone visit will be conducted via a call between you and your physician/provider. We have found that certain health care needs can be provided without the need for an in-person physical exam.  This service lets us provide the care you need with a telephone conversation.  If a prescription is necessary we can send it directly to your pharmacy.  If lab work is needed we can place an order for that and you can then stop by our lab to have the test done at a later time.  Video visits are billed at different rates depending on your insurance coverage.  Please reach out to your insurance provider with any questions.  If during the course of the call the physician/provider feels a video visit is not appropriate, you will not be charged for this service.\"  Patient has given verbal consent for telephone visit? Yes  How would you like to obtain your AVS? [facility] - PRIMARY CARE SKIN    CC : seborrheic dermatitis, acne  SUBJECTIVE:                                                    Jet Yañez is a 35 year old male who presents to clinic today for follow-up of seborrheic dermatitis on the sides of the nose and on the scalp.     Current treatment :   Continue with tacrolimus 0.1 % two times per day when needed - uses this 1-2 weeks then clear for a period of time  Minocycline 50 mg two times per day for acne flares - may need to use this for 2 weeks then not for months  Uses fluocinonide 0.05% solution when needed for scalp itching and scaling        Issue Two: Acne control has been fairly satisfactory. Uses  minocycline 50 mg BID for 2 weeks, then breakout clears up for months.   Issue three : toenail \" fungus \" on one toe nail, has been using ciclopirox 8 % solution but not consistently    Uses sunscreen : YES.  Previous history of significant sun exposure : " YES.    Personal Medical History  Skin Cancer : NO  Eczema Psoriasis Rosacea Sensitive Skin Autoimmune   ? YES - since childhood NO NO ? YES NO     Family Medical History  Skin Cancer : NO  Eczema Psoriasis Rosacea Sensitive Skin Autoimmune   YES NO NO YES YES - Graves' disease     Occupation : engineering (indoor).    There is no problem list on file for this patient.      No past medical history on file. Past Surgical History:   Procedure Laterality Date     NO HISTORY OF SURGERY        Social History     Tobacco Use     Smoking status: Never Smoker     Smokeless tobacco: Never Used   Substance Use Topics     Alcohol use: Yes     Comment: 3 to 5 drinkd weekly     Drug use: No         Prescription Medications as of 6/25/2020       Rx Number Disp Refills Start End Last Dispensed Date Next Fill Date Owning Pharmacy    ciclopirox (PENLAC) 8 % external solution  6.6 mL 3 6/25/2020    [id number] [place] [address]    Sig: Apply to affected nails q hs    Class: E-Prescribe    fluocinonide (LIDEX) 0.05 % external solution  60 mL 0 10/14/2019    [id number] [place] [address] [address]    Sig: Apply topically daily    Class: E-Prescribe    Route: Topical    metroNIDAZOLE (METROGEL) 0.75 % external gel  45 g 3 6/25/2020    [id number] [place] [address]    Sig: Apply topically 2 times daily    Class: E-Prescribe    Route: Topical    minocycline (MINOCIN) 50 MG capsule  60 capsule 0 6/25/2020    [id number] [place] [address]    Sig: TAKE 2 CAPS BY MOUTH DAILY    Class: E-Prescribe    tacrolimus (PROTOPIC) 0.1 % external ointment  30 g 3 6/25/2020    [facility] [place] [address]    Sig: Apply to AA on face bid when needed    Class: E-Prescribe    CICLOPIROX & LACQUER REMOVAL EX            Class: Historical    Route: Apply externally    desonide (DESOWEN) 0.05 % cream  15 g 0 8/3/2016    [id number] [place] [facility] [place] [facility] [address] [facility]    Sig: Apply sparingly to affected area on side of the nose  q day for 7 days and then when needed. Not to be used on a daily basis.    Class: E-Prescribe    fluocinonide (LIDEX) 0.05 % external solution  60 mL 0 10/14/2019    [facility] [place] [facility] [place] [facility] [address] [address]    Sig: Apply topically daily    Class: E-Prescribe    Route: Topical    minocycline (MINOCIN,DYNACIN) 50 MG capsule    4/25/2016        Class: Historical          No Known Allergies     INTEGUMENTARY/SKIN: NEGATIVE for acne. POSITIVE for changing lesion, nail changes, pigmentation, pruritis, rash and scaling  ROS : 14 point review of systems was negative except the symptoms listed above in the HPI.        OBJECTIVE:                                                    GENERAL: healthy, alert and no distress  SKIN: Nelson Skin Type - II.  Face were examined. The dermatoscope was used to help evaluate pigmented lesions.  Skin Pertinent Findings:  Face digital photos :  scattered papules and erythema along the ala nasi         Diagnostic Test Results:  none     ASSESSMENT:                                                      Encounter Diagnoses   Name Primary?     Seborrheic dermatitis Yes     Acne vulgaris      Rosacea      Onychomycosis        MDM:  ? Rosacea - discussed daily use of metronidazole gel 0.75% with the goal of not needing to use minocycline and prevent the papules.    PLAN:                                                    Patient Instructions   Metronidazole gel 0.75% apply two times per day to mid portion of the face  Minocycline 50 mg 1 cap orally two times per day when needed  Tacrolimus 0.1 % cream -apply to affected area two times per day when needed  Ciclopirox 8 % apply to affected nails at bedtime - needs to be used consistency     Recheck in one year     The patient was counseled to use products free of fragrance, dyes, and plants. The importance of using bland  cleansers and the regular use of heavy bland emollient creams was impressed upon the patient.    PROCEDURES:                                                    None.    TT  20 minutes.  CT : 15 minutes.

## 2020-06-25 ENCOUNTER — VIRTUAL VISIT (OUTPATIENT)
Dept: FAMILY MEDICINE | Facility: CLINIC | Age: 35
End: 2020-06-25
Payer: COMMERCIAL

## 2020-06-25 DIAGNOSIS — L70.0 ACNE VULGARIS: ICD-10-CM

## 2020-06-25 DIAGNOSIS — B35.1 ONYCHOMYCOSIS: ICD-10-CM

## 2020-06-25 DIAGNOSIS — L21.9 SEBORRHEIC DERMATITIS: Primary | ICD-10-CM

## 2020-06-25 DIAGNOSIS — L71.9 ROSACEA: ICD-10-CM

## 2020-06-25 PROCEDURE — 99214 OFFICE O/P EST MOD 30 MIN: CPT | Mod: 95 | Performed by: FAMILY MEDICINE

## 2020-06-25 RX ORDER — MINOCYCLINE HYDROCHLORIDE 50 MG/1
CAPSULE ORAL
Qty: 60 CAPSULE | Refills: 0 | Status: SHIPPED | OUTPATIENT
Start: 2020-06-25 | End: 2020-09-10

## 2020-06-25 RX ORDER — TACROLIMUS 1 MG/G
OINTMENT TOPICAL
Qty: 30 G | Refills: 3 | Status: SHIPPED | OUTPATIENT
Start: 2020-06-25 | End: 2021-11-15

## 2020-06-25 RX ORDER — CICLOPIROX 80 MG/ML
SOLUTION TOPICAL
Qty: 6.6 ML | Refills: 3 | Status: SHIPPED | OUTPATIENT
Start: 2020-06-25 | End: 2021-11-15

## 2020-06-25 RX ORDER — METRONIDAZOLE 7.5 MG/G
GEL TOPICAL 2 TIMES DAILY
Qty: 45 G | Refills: 3 | Status: SHIPPED | OUTPATIENT
Start: 2020-06-25 | End: 2021-11-15

## 2020-06-25 NOTE — PATIENT INSTRUCTIONS
Metronidazole gel 0.75% apply two times per day to mid portion of the face  Minocycline 50 mg 1 cap orally two times per day when needed  Tacrolimus 0.1 % cream -apply to affected area two times per day when needed  Ciclopirox 8 % apply to affected nails at bedtime - needs to be used consistency     Recheck in one year

## 2020-06-25 NOTE — LETTER
"    6/25/2020         RE: Jet Yañez  [address] [facility] [address]        Dear Colleague,    Thank you for referring your patient, Jet Yañez, to the [facility] [place]. Please see a copy of my visit note below.    Jet Yañez is a 35 year old male  who is being evaluated via a billable telephone visit.    The patient has been notified of following:   \"This telephone visit will be conducted via a call between you and your physician/provider. We have found that certain health care needs can be provided without the need for an in-person physical exam.  This service lets us provide the care you need with a telephone conversation.  If a prescription is necessary we can send it directly to your pharmacy.  If lab work is needed we can place an order for that and you can then stop by our lab to have the test done at a later time.  Video visits are billed at different rates depending on your insurance coverage.  Please reach out to your insurance provider with any questions.  If during the course of the call the physician/provider feels a video visit is not appropriate, you will not be charged for this service.\"  Patient has given verbal consent for telephone visit? Yes  [facility] you [facility] to [facility] - PRIMARY CARE SKIN    CC : seborrheic dermatitis, acne  SUBJECTIVE:                                                    Jet Yañez is a 35 year old male who presents to clinic today for follow-up of seborrheic dermatitis on the sides of the nose and on the scalp.     Current treatment :   Continue with tacrolimus 0.1 % two times per day when needed - uses this 1-2 weeks then clear for a period of time  Minocycline 50 mg two times per day for acne flares - may need to use this for 2 weeks then not for months  Uses fluocinonide 0.05% solution when needed for scalp itching and scaling        Issue Two: Acne control has been fairly satisfactory. Uses  " "minocycline 50 mg BID for 2 weeks, then breakout clears up for months.   Issue three : toenail \" fungus \" on one toe nail, has been using ciclopirox 8 % solution but not consistently    Uses sunscreen : YES.  Previous history of significant sun exposure : YES.    Personal Medical History  Skin Cancer : NO  Eczema Psoriasis Rosacea Sensitive Skin Autoimmune   ? YES - since childhood NO NO ? YES NO     Family Medical History  Skin Cancer : NO  Eczema Psoriasis Rosacea Sensitive Skin Autoimmune   YES NO NO YES YES - Graves' disease     Occupation : engineering (indoor).    There is no problem list on file for this patient.      No past medical history on file. Past Surgical History:   Procedure Laterality Date     NO HISTORY OF SURGERY        Social History     Tobacco Use     Smoking status: Never Smoker     Smokeless tobacco: Never Used   Substance Use Topics     Alcohol use: Yes     Comment: 3 to 5 drinkd weekly     Drug use: No         Prescription Medications as of 6/25/2020       Rx Number Disp Refills Start End Last Dispensed Date Next Fill Date Owning Pharmacy    ciclopirox (PENLAC) 8 % external solution  6.6 mL 3 6/25/2020    [facility] [place] [address]    Sig: Apply to affected nails q hs    Class: E-Prescribe    fluocinonide (LIDEX) 0.05 % external solution  60 mL 0 10/14/2019    [id number] [place] [address] [address]    Sig: Apply topically daily    Class: E-Prescribe    Route: Topical    metroNIDAZOLE (METROGEL) 0.75 % external gel  45 g 3 6/25/2020    [id number] [place] [address]    Sig: Apply topically 2 times daily    Class: E-Prescribe    Route: Topical    minocycline (MINOCIN) 50 MG capsule  60 capsule 0 6/25/2020    [id number] [place] [address]    Sig: TAKE 2 CAPS BY MOUTH DAILY    Class: E-Prescribe    tacrolimus (PROTOPIC) 0.1 % external ointment  30 g 3 6/25/2020    [id number] [place] [facility] [place] [id number] [facility] [address]    Sig: Apply to AA on face bid when needed    Class: E-Prescribe    CICLOPIROX & LACQUER REMOVAL EX            Class: Historical    Route: Apply externally    desonide (DESOWEN) 0.05 % cream  15 g 0 8/3/2016    [id number] [place] [address] [facility] [address]    Sig: Apply sparingly to affected area on side of the nose  q day for 7 days and then when needed. Not to be used on a daily basis.    Class: E-Prescribe    fluocinonide (LIDEX) 0.05 % external solution  60 mL 0 10/14/2019    [id number] [place] [address]    Sig: Apply topically daily    Class: E-Prescribe    Route: Topical    minocycline (MINOCIN,DYNACIN) 50 MG capsule    4/25/2016        Class: Historical          No Known Allergies     INTEGUMENTARY/SKIN: NEGATIVE for acne. POSITIVE for changing lesion, nail changes, pigmentation, pruritis, rash and scaling  ROS : 14 point review of systems was negative except the symptoms listed above in the HPI.        OBJECTIVE:                                                    GENERAL: healthy, alert and no distress  SKIN: Nelson Skin Type - II.  Face were examined. The dermatoscope was used to help evaluate pigmented lesions.  Skin Pertinent Findings:  Face digital photos :  scattered papules and erythema along the ala nasi         Diagnostic Test Results:  none     ASSESSMENT:                                                      Encounter Diagnoses   Name Primary?     Seborrheic dermatitis Yes     Acne vulgaris      Rosacea      Onychomycosis        MDM:  ? Rosacea - discussed daily use of metronidazole gel 0.75% with the goal of not needing to use minocycline and prevent the papules.    PLAN:                                                    Patient Instructions   Metronidazole gel 0.75% apply two times per day to mid portion of the face  Minocycline 50 mg 1 cap orally two times per day when needed  Tacrolimus 0.1 % cream -apply to affected  area two times per day when needed  Ciclopirox 8 % apply to affected nails at bedtime - needs to be used consistency     Recheck in one year     The patient was counseled to use products free of fragrance, dyes, and plants. The importance of using bland cleansers and the regular use of heavy bland emollient creams was impressed upon the patient.    PROCEDURES:                                                    None.    TT  20 minutes.  CT : 15 minutes.      Again, thank you for allowing me to participate in the care of your patient.        Sincerely,        Lisa Lao MD

## 2020-09-08 DIAGNOSIS — L70.0 ACNE VULGARIS: ICD-10-CM

## 2020-09-09 NOTE — TELEPHONE ENCOUNTER
"Last Written Prescription Date:  06/25/20  Last Fill Quantity: 60,  # refills: 0   Last office visit: 12/27/2018 with prescribing provider:   06/25/20 virtual  Future Office Visit:        Requested Prescriptions   Pending Prescriptions Disp Refills     minocycline (MINOCIN) 50 MG capsule [Pharmacy Med Name: MINOCYCLINE 50 MG CAPSULE] 60 capsule 0     Sig: TAKE 2 CAPSULES BY MOUTH EVERY DAY       Oral Acne/Rosacea Medications Protocol Failed - 9/8/2020  8:12 AM        Failed - Confirmation of diagnosis is required     Please confirm diagnosis is acne or rosacea.     If NOT acne or rosacea; refer request to provider for further evaluation.    If diagnosis IS acne or rosacea, OK to refill BASED ON PREVIOUS REFILL CLINICAL NOTE RECOMMENDATION.          Passed - Patient is 12 years of age or older        Passed - Recent (12 mo) or future (30 days) visit within the authorizing provider's specialty     Patient has had an office visit with the authorizing provider or a provider within the authorizing providers department within the previous 12 mos or has a future within next 30 days. See \"Patient Info\" tab in inbasket, or \"Choose Columns\" in Meds & Orders section of the refill encounter.              Passed - Medication is active on med list               "

## 2020-09-10 RX ORDER — MINOCYCLINE HYDROCHLORIDE 50 MG/1
CAPSULE ORAL
Qty: 60 CAPSULE | Refills: 0 | Status: SHIPPED | OUTPATIENT
Start: 2020-09-10 | End: 2020-10-06

## 2020-10-06 DIAGNOSIS — L70.0 ACNE VULGARIS: ICD-10-CM

## 2020-10-06 RX ORDER — MINOCYCLINE HYDROCHLORIDE 50 MG/1
CAPSULE ORAL
Qty: 60 CAPSULE | Refills: 9 | Status: SHIPPED | OUTPATIENT
Start: 2020-10-06 | End: 2021-10-20

## 2020-10-06 NOTE — TELEPHONE ENCOUNTER
Last Written Prescription Date:  09/10/20  Last Fill Quantity: 60,  # refills: 0   Last office visit: 12/27/2018 with prescribing provider:   06/25/20 Virtual visit  Future Office Visit:        Requested Prescriptions   Pending Prescriptions Disp Refills     minocycline (MINOCIN) 50 MG capsule 60 capsule 0     Sig: TAKE 2 CAPSULES BY MOUTH EVERY DAY       There is no refill protocol information for this order

## 2020-11-22 ENCOUNTER — HEALTH MAINTENANCE LETTER (OUTPATIENT)
Age: 35
End: 2020-11-22

## 2021-09-19 ENCOUNTER — HEALTH MAINTENANCE LETTER (OUTPATIENT)
Age: 36
End: 2021-09-19

## 2021-10-19 RX ORDER — MINOCYCLINE HYDROCHLORIDE 50 MG/1
CAPSULE ORAL
Qty: 60 CAPSULE | Refills: 5 | OUTPATIENT
Start: 2021-10-19

## 2021-10-20 ENCOUNTER — MYC MEDICAL ADVICE (OUTPATIENT)
Dept: FAMILY MEDICINE | Facility: CLINIC | Age: 36
End: 2021-10-20

## 2021-10-20 DIAGNOSIS — L70.0 ACNE VULGARIS: ICD-10-CM

## 2021-10-20 RX ORDER — MINOCYCLINE HYDROCHLORIDE 50 MG/1
CAPSULE ORAL
Qty: 60 CAPSULE | Refills: 5 | OUTPATIENT
Start: 2021-10-20

## 2021-10-20 RX ORDER — MINOCYCLINE HYDROCHLORIDE 50 MG/1
CAPSULE ORAL
Qty: 60 CAPSULE | Refills: 0 | Status: SHIPPED | OUTPATIENT
Start: 2021-10-20 | End: 2021-11-15

## 2021-11-10 RX ORDER — MINOCYCLINE HYDROCHLORIDE 50 MG/1
CAPSULE ORAL
Qty: 60 CAPSULE | OUTPATIENT
Start: 2021-11-10

## 2021-11-15 ENCOUNTER — OFFICE VISIT (OUTPATIENT)
Dept: FAMILY MEDICINE | Facility: CLINIC | Age: 36
End: 2021-11-15
Payer: COMMERCIAL

## 2021-11-15 VITALS — SYSTOLIC BLOOD PRESSURE: 104 MMHG | DIASTOLIC BLOOD PRESSURE: 70 MMHG

## 2021-11-15 DIAGNOSIS — L21.9 SEBORRHEIC DERMATITIS: ICD-10-CM

## 2021-11-15 DIAGNOSIS — L20.89 FLEXURAL ATOPIC DERMATITIS: Primary | ICD-10-CM

## 2021-11-15 DIAGNOSIS — L70.0 ACNE VULGARIS: ICD-10-CM

## 2021-11-15 DIAGNOSIS — D17.30 LIPOMA OF SKIN AND SUBCUTANEOUS TISSUE: ICD-10-CM

## 2021-11-15 PROCEDURE — 99214 OFFICE O/P EST MOD 30 MIN: CPT | Performed by: PHYSICIAN ASSISTANT

## 2021-11-15 RX ORDER — TACROLIMUS 1 MG/G
OINTMENT TOPICAL
Qty: 30 G | Refills: 3 | Status: SHIPPED | OUTPATIENT
Start: 2021-11-15

## 2021-11-15 RX ORDER — MINOCYCLINE HYDROCHLORIDE 50 MG/1
CAPSULE ORAL
Qty: 180 CAPSULE | Refills: 0 | Status: SHIPPED | OUTPATIENT
Start: 2021-11-15 | End: 2022-06-13

## 2021-11-15 RX ORDER — TRIAMCINOLONE ACETONIDE 0.25 MG/G
CREAM TOPICAL
Qty: 80 G | Refills: 0 | Status: SHIPPED | OUTPATIENT
Start: 2021-11-15

## 2021-11-15 NOTE — PROGRESS NOTES
[facility] Dermatology Note  Encounter Date: Nov 15, 2021  Office Visit      Dermatology Problem List:  1. Acne Rosacea - tacrolimus 0.1% ointment, minocycline 50mg po every day  2. Lipomas - referral for derm surg  3. Onychomycosis - R great toenail, resolved    Social: has a daughter  ____________________________________________    Assessment & Plan:  # Acne vulgaris/rosacea.   -continue tacrolimus 0.1% ointment  -ok to continue minocycline but would ask that he reduce is dose to 50mg daily as opposed to 100mg daily - edu on long term negative effects  -briefly discussed isotretinoin and patient will consider this prior to next appointment .    # Masses - likely Lipomas - x4 - see photos  -referral to derm surg    #Onychomycosis -  Great toenail, appears resolved  -reassured    Procedures Performed:   none    Follow-up: 1 year(s) in-person, or earlier for new or changing lesions    Staff:     All risks, benefits and alternatives were discussed with patient.  Patient is in agreement and understands the assessment and plan.  All questions were answered.    Jaelyn Laura PA-C, MPAS  [facility] [place]: Phone: [phone], Fax: [phone]  [facility]: Phone: [phone],  Fax: [phone]  ____________________________________________    CC: Derm Problem (rash on left hand, two lypomas on chest, acne and eczema medication refills.)      HPI:  Mr. Jet Yañez is a 36 year old male who presents today as a return patient for acne refills as well as to discuss a few lipomas on his trunk/abdomen. Notes he needs refills of tacrolimus as this seems to keep his acne/rosacea at bay. Previously tried metronidazole without benefit. Would also like to continue his minocycline. He takes 100mg daily for a few days with flares. He gets flares every few weeks. Notes some nausea with the minocycline, but is otherwise  tolerating it well.     Regarding lesions - notes lipomas on the L chest, R upper abdomen, R flank and L tricep. Notes they have slowly increased in size. Is inquiring about removal. No associated sx.     Patient is otherwise feeling well, without additional concerns.    Labs:  none    Physical Exam:  Vitals: /70   SKIN: Full skin, which includes the head/face, both arms, chest, back, abdomen,both legs, genitalia and/or groin buttocks, digits and/or nails, was examined.   - face appears nml, no active papules or acneiform lesions. Does have very mild erythema near the nose and medial cheeks  -L chest 9mm soft, round nontender nodule  -R upper abdomen 3cm palpable soft nontender nodule  -R flank  3cm palpable soft nontender nodule  -L tricep - 7mm palpable soft nontender nodule  - No other lesions of concern on areas examined.     Medications:  Current Outpatient Medications   Medication     CICLOPIROX & LACQUER REMOVAL EX     ciclopirox (PENLAC) 8 % external solution     desonide (DESOWEN) 0.05 % cream     fluocinonide (LIDEX) 0.05 % external solution     metroNIDAZOLE (METROGEL) 0.75 % external gel     minocycline (MINOCIN) 50 MG capsule     tacrolimus (PROTOPIC) 0.1 % external ointment     fluocinonide (LIDEX) 0.05 % external solution     minocycline (MINOCIN,DYNACIN) 50 MG capsule     No current facility-administered medications for this visit.      Past Medical/Surgical History:   There is no problem list on file for this patient.    No past medical history on file.    CC Dr. Ramirez on close of this encounter.  All risks, benefits and alternatives were discussed with patient.  Patient is in agreement and understands the assessment and plan.  All questions were answered.    Jaelyn Laura PA-C, MPAS  [facility] [place]: Phone: [phone], Fax: [phone]  [facility]: Phone: [phone],  Fax:  [phone]

## 2021-11-15 NOTE — LETTER
11/15/2021         RE: Jet Yañez  [address]  [place] [address]        Dear Colleague,    Thank you for referring your patient, Jet Yañez, to the [facility] [place]. Please see a copy of my visit note below.    [facility] Dermatology Note  Encounter Date: Nov 15, 2021  Office Visit      Dermatology Problem List:  1. Acne Rosacea - tacrolimus 0.1% ointment, minocycline 50mg po every day  2. Lipomas - referral for derm surg  3. Onychomycosis - R great toenail, resolved    Social: has a daughter  ____________________________________________    Assessment & Plan:  # Acne vulgaris/rosacea.   -continue tacrolimus 0.1% ointment  -ok to continue minocycline but would ask that he reduce is dose to 50mg daily as opposed to 100mg daily - edu on long term negative effects  -briefly discussed isotretinoin and patient will consider this prior to next appointment .    # Masses - likely Lipomas - x4 - see photos  -referral to derm surg    #Onychomycosis -  Great toenail, appears resolved  -reassured    Procedures Performed:   none    Follow-up: 1 year(s) in-person, or earlier for new or changing lesions    Staff:     All risks, benefits and alternatives were discussed with patient.  Patient is in agreement and understands the assessment and plan.  All questions were answered.    Jaelyn Laura PA-C, MPAS  [facility]: Phone: [phone], Fax: [phone]  [facility]: Phone: [phone],  Fax: [phone]  ____________________________________________    CC: Derm Problem (rash on left hand, two lypomas on chest, acne and eczema medication refills.)      HPI:  Mr. Jet Yañez is a 36 year old male who presents today as a return patient for acne refills as well as to discuss a few lipomas on his trunk/abdomen. Notes he needs refills of tacrolimus as this seems to keep his  acne/rosacea at bay. Previously tried metronidazole without benefit. Would also like to continue his minocycline. He takes 100mg daily for a few days with flares. He gets flares every few weeks. Notes some nausea with the minocycline, but is otherwise tolerating it well.     Regarding lesions - notes lipomas on the L chest, R upper abdomen, R flank and L tricep. Notes they have slowly increased in size. Is inquiring about removal. No associated sx.     Patient is otherwise feeling well, without additional concerns.    Labs:  none    Physical Exam:  Vitals: /70   SKIN: Full skin, which includes the head/face, both arms, chest, back, abdomen,both legs, genitalia and/or groin buttocks, digits and/or nails, was examined.   - face appears nml, no active papules or acneiform lesions. Does have very mild erythema near the nose and medial cheeks  -L chest 9mm soft, round nontender nodule  -R upper abdomen 3cm palpable soft nontender nodule  -R flank  3cm palpable soft nontender nodule  -L tricep - 7mm palpable soft nontender nodule  - No other lesions of concern on areas examined.     Medications:  Current Outpatient Medications   Medication     CICLOPIROX & LACQUER REMOVAL EX     ciclopirox (PENLAC) 8 % external solution     desonide (DESOWEN) 0.05 % cream     fluocinonide (LIDEX) 0.05 % external solution     metroNIDAZOLE (METROGEL) 0.75 % external gel     minocycline (MINOCIN) 50 MG capsule     tacrolimus (PROTOPIC) 0.1 % external ointment     fluocinonide (LIDEX) 0.05 % external solution     minocycline (MINOCIN,DYNACIN) 50 MG capsule     No current facility-administered medications for this visit.      Past Medical/Surgical History:   There is no problem list on file for this patient.    No past medical history on file.    CC Dr. Ramirez on close of this encounter.  All risks, benefits and alternatives were discussed with patient.  Patient is in agreement and understands the assessment and plan.  All questions  were answered.    Jaelyn Laura PA-C, MPAS  [facility]: Phone: [phone], Fax: [phone]  [facility]: Phone: [phone],  Fax: [phone]                             Again, thank you for allowing me to participate in the care of your patient.        Sincerely,        Jaelyn Laura PA-C

## 2021-11-16 ENCOUNTER — TELEPHONE (OUTPATIENT)
Dept: DERMATOLOGY | Facility: CLINIC | Age: 36
End: 2021-11-16
Payer: COMMERCIAL

## 2021-11-16 NOTE — TELEPHONE ENCOUNTER
Called and spoke with patient to schedule the procedure to remove the lipomas.  He would like to wait but will call back if he would like to schedule.    Rod Lorenzo, Procedure

## 2022-01-09 ENCOUNTER — HEALTH MAINTENANCE LETTER (OUTPATIENT)
Age: 37
End: 2022-01-09

## 2022-06-13 DIAGNOSIS — L70.0 ACNE VULGARIS: ICD-10-CM

## 2022-06-13 RX ORDER — MINOCYCLINE HYDROCHLORIDE 50 MG/1
CAPSULE ORAL
Qty: 180 CAPSULE | Refills: 0 | Status: SHIPPED | OUTPATIENT
Start: 2022-06-13 | End: 2022-12-28

## 2022-06-13 NOTE — TELEPHONE ENCOUNTER
Routing refill request to provider for review/approval because:  Failed protocol    Melita RUVALCABA RN  [facility] [place]  [phone]

## 2022-06-26 ENCOUNTER — HEALTH MAINTENANCE LETTER (OUTPATIENT)
Age: 37
End: 2022-06-26

## 2022-11-20 ENCOUNTER — HEALTH MAINTENANCE LETTER (OUTPATIENT)
Age: 37
End: 2022-11-20

## 2022-12-26 DIAGNOSIS — L70.0 ACNE VULGARIS: ICD-10-CM

## 2022-12-28 RX ORDER — MINOCYCLINE HYDROCHLORIDE 50 MG/1
CAPSULE ORAL
Qty: 30 CAPSULE | Refills: 5 | Status: SHIPPED | OUTPATIENT
Start: 2022-12-28

## 2022-12-28 NOTE — TELEPHONE ENCOUNTER
Routing refill request to provider for review/approval because:  Drug not on the [facility] refill protocol   Patient needs to be seen because it has been more than 1 year since last office visit.  Last seen 11/15/21    Melita RUVALCABA RN  [facility] [place]  [phone]

## 2023-07-08 ENCOUNTER — HEALTH MAINTENANCE LETTER (OUTPATIENT)
Age: 38
End: 2023-07-08

## 2024-02-06 DIAGNOSIS — L70.0 ACNE VULGARIS: ICD-10-CM

## 2024-02-07 RX ORDER — MINOCYCLINE HYDROCHLORIDE 50 MG/1
CAPSULE ORAL
Qty: 30 CAPSULE | Refills: 5 | OUTPATIENT
Start: 2024-02-07

## 2024-02-07 NOTE — TELEPHONE ENCOUNTER
Rx denied.  Pt needs an appt.    Melita RUVALCABA RN  [facility] [place] [place]  [phone]

## 2024-08-31 ENCOUNTER — HEALTH MAINTENANCE LETTER (OUTPATIENT)
Age: 39
End: 2024-08-31